# Patient Record
Sex: FEMALE
[De-identification: names, ages, dates, MRNs, and addresses within clinical notes are randomized per-mention and may not be internally consistent; named-entity substitution may affect disease eponyms.]

---

## 2022-12-20 PROBLEM — Z00.00 ENCOUNTER FOR PREVENTIVE HEALTH EXAMINATION: Status: ACTIVE | Noted: 2022-12-20

## 2022-12-23 ENCOUNTER — APPOINTMENT (OUTPATIENT)
Dept: NEUROSURGERY | Facility: CLINIC | Age: 57
End: 2022-12-23

## 2022-12-23 DIAGNOSIS — Z86.79 PERSONAL HISTORY OF OTHER DISEASES OF THE CIRCULATORY SYSTEM: ICD-10-CM

## 2022-12-23 PROCEDURE — 99202 OFFICE O/P NEW SF 15 MIN: CPT | Mod: 95

## 2022-12-23 NOTE — HISTORY OF PRESENT ILLNESS
[de-identified] : Ms. BURNETT is a pleasant 57 year old female who presents today with a chief complaint of RIGHT ear pulsatile tinnitus.  It first started about 10 years ago, intermittent and faint.  Since that time it has been getting progressively worse.  It is described as an intermittent (only occurring at night), whooshing sound that is in sync with her pulse.  She has never tried neck pressure to see if it changes the sound.  She states that the only thing that helps it is head elevation/using more pillows.  Laying flat makes it worse.  \par \par She has been seen by ENT and had a normal hearing test as per patient.  \par \par She denies any headaches.  She feels like she had rapid deterioration of her vision over the last year or two, saw optometrist last year.

## 2022-12-23 NOTE — REASON FOR VISIT
[Home] : at home, [unfilled] , at the time of the visit. [Medical Office: ([facility])___] : at the medical office located in  [Patient] : the patient [Self] : self [New Patient Visit] : a new patient visit [FreeTextEntry1] : Pulsatile Tinnitus

## 2023-01-03 RX ORDER — DIAZEPAM 5 MG/1
5 TABLET ORAL
Qty: 2 | Refills: 0 | Status: ACTIVE | COMMUNITY
Start: 2023-01-03 | End: 1900-01-01

## 2023-01-13 ENCOUNTER — TRANSCRIPTION ENCOUNTER (OUTPATIENT)
Age: 58
End: 2023-01-13

## 2023-01-20 ENCOUNTER — APPOINTMENT (OUTPATIENT)
Dept: MRI IMAGING | Facility: CLINIC | Age: 58
End: 2023-01-20

## 2023-02-10 ENCOUNTER — RESULT REVIEW (OUTPATIENT)
Age: 58
End: 2023-02-10

## 2023-02-24 ENCOUNTER — APPOINTMENT (OUTPATIENT)
Dept: MRI IMAGING | Facility: CLINIC | Age: 58
End: 2023-02-24
Payer: COMMERCIAL

## 2023-02-24 PROCEDURE — 70545 MR ANGIOGRAPHY HEAD W/DYE: CPT | Mod: 59

## 2023-02-24 PROCEDURE — 70553 MRI BRAIN STEM W/O & W/DYE: CPT

## 2023-02-24 PROCEDURE — 70546 MR ANGIOGRAPH HEAD W/O&W/DYE: CPT | Mod: 59

## 2023-02-24 PROCEDURE — A9585: CPT

## 2023-02-24 PROCEDURE — A9585: CPT | Mod: JW

## 2023-03-07 ENCOUNTER — APPOINTMENT (OUTPATIENT)
Dept: NEUROSURGERY | Facility: CLINIC | Age: 58
End: 2023-03-07

## 2023-03-07 ENCOUNTER — APPOINTMENT (OUTPATIENT)
Dept: NEUROSURGERY | Facility: CLINIC | Age: 58
End: 2023-03-07
Payer: COMMERCIAL

## 2023-03-07 DIAGNOSIS — H93.A9 PULSATILE TINNITUS, UNSPECIFIED EAR: ICD-10-CM

## 2023-03-07 PROCEDURE — 99442: CPT

## 2023-03-07 NOTE — REASON FOR VISIT
[Home] : at home, [unfilled] , at the time of the visit. [Medical Office: ([facility])___] : at the medical office located in  [Verbal consent obtained from patient] : the patient, [unfilled] [Follow-Up: _____] : a [unfilled] follow-up visit

## 2023-03-12 NOTE — ASSESSMENT
[FreeTextEntry1] : Impression:\par RIGHT Pulsatile Tinnitus\par No Headaches \par \par MRA Head 2/2023 reveals no dural AVF or AVM\par MRV Head 2/2023 reveals a right jugular bulb diverticulum; no venous sinus stenosis\par MR Head 2/2023 reveals no tumor, stroke or hemorrhage\par \par GIOVANNA HAND suffers from pulsatile tinnitus from venous origin, specifically jugular bulb diverticulum.  I have asked her to try pressing on the right side of her neck to help confirm that this is the cause of her pulsatile tinnitus.  At this time, the pulsatile tinnitus is intermittent and not very bothersome.  I recommend she follow up with me if the pulsatile tinnitus becomes more bothersome.\par \par Plan:\par Follow up with me if pulsatile tinnitus becomes more bothersome

## 2023-03-12 NOTE — HISTORY OF PRESENT ILLNESS
[de-identified] : Ms. BURNETT is a pleasant 57 year old female who presents today for follow up f RIGHT ear pulsatile tinnitus.  It first started about 10 years ago, intermittent and faint.  Since that time it has been getting progressively worse.  It is described as an intermittent (only occurring at night), whooshing sound that is in sync with her pulse.  She has never tried neck pressure to see if it changes the sound.  She states that the only thing that helps it is head elevation/using more pillows.  Laying flat makes it worse.  \par \par She has been seen by ENT and had a normal hearing test as per patient.  \par \par She denies any headaches.  She feels like she had rapid deterioration of her vision over the last year or two, saw optometrist last year.